# Patient Record
Sex: FEMALE | Race: WHITE
[De-identification: names, ages, dates, MRNs, and addresses within clinical notes are randomized per-mention and may not be internally consistent; named-entity substitution may affect disease eponyms.]

---

## 2020-08-13 LAB
ALBUMIN SERPL BCP-MCNC: 3.9 G/DL (ref 3.4–5)
ALBUMIN/GLOB SERPL: 1.1 {RATIO} (ref 1.1–1.5)
ALP SERPL-CCNC: 59 IU/L (ref 46–116)
ALT SERPL W P-5'-P-CCNC: 25 U/L (ref 30–65)
ANION GAP SERPL CALCULATED.3IONS-SCNC: 9 MMOL/L (ref 8–16)
APTT PPP: 25 SECONDS (ref 22–32)
AST SERPL W P-5'-P-CCNC: 24 U/L (ref 10–37)
BASOPHILS # BLD AUTO: 0 X10'3 (ref 0–0.2)
BASOPHILS NFR BLD AUTO: 0.3 % (ref 0–1)
BILIRUB SERPL-MCNC: 0.5 MG/DL (ref 0–1)
BUN SERPL-MCNC: 20 MG/DL (ref 7–18)
BUN/CREAT SERPL: 17.9 (ref 6.6–38)
CALCIUM SERPL-MCNC: 9.4 MG/DL (ref 8.5–10.1)
CHLORIDE SERPL-SCNC: 103 MMOL/L (ref 99–107)
CO2 SERPL-SCNC: 27.3 MMOL/L (ref 24–32)
CREAT SERPL-MCNC: 1.12 MG/DL (ref 0.4–0.9)
EOSINOPHIL # BLD AUTO: 0 X10'3 (ref 0–0.9)
EOSINOPHIL NFR BLD AUTO: 0.2 % (ref 0–6)
ERYTHROCYTE [DISTWIDTH] IN BLOOD BY AUTOMATED COUNT: 15 % (ref 11.5–14.5)
GFR SERPL CREATININE-BSD FRML MDRD: 47 ML/MIN
GLUCOSE SERPL-MCNC: 113 MG/DL (ref 70–104)
HCT VFR BLD AUTO: 34.8 % (ref 35–45)
HGB BLD-MCNC: 11.3 G/DL (ref 12–16)
INR PPP: 1 INR
LYMPHOCYTES # BLD AUTO: 1.7 X10'3 (ref 1.1–4.8)
LYMPHOCYTES NFR BLD AUTO: 14.6 % (ref 21–51)
MCH RBC QN AUTO: 30 PG (ref 27–31)
MCHC RBC AUTO-ENTMCNC: 32.4 G/DL (ref 33–36.5)
MCV RBC AUTO: 92.6 FL (ref 78–98)
MONOCYTES # BLD AUTO: 0.3 X10'3 (ref 0–0.9)
MONOCYTES NFR BLD AUTO: 2.9 % (ref 2–12)
NEUTROPHILS # BLD AUTO: 9.5 X10'3 (ref 1.8–7.7)
NEUTROPHILS NFR BLD AUTO: 82 % (ref 42–75)
PLATELET # BLD AUTO: 443 X10'3 (ref 140–440)
PLATELET FUNCTION (ADP): (no result) SECONDS (ref 63–104)
PMV BLD AUTO: 8.2 FL (ref 7.4–10.4)
POTASSIUM SERPL-SCNC: 4.2 MMOL/L (ref 3.4–5.1)
PROT SERPL-MCNC: 7.4 G/DL (ref 6.4–8.2)
PROTHROMBIN TIME: 10.3 SECONDS (ref 9–12)
RBC # BLD AUTO: 3.76 X10'6 (ref 4.2–5.6)
SODIUM SERPL-SCNC: 139 MMOL/L (ref 135–145)

## 2020-08-18 ENCOUNTER — HOSPITAL ENCOUNTER (OUTPATIENT)
Dept: HOSPITAL 94 - PAS | Age: 80
Discharge: HOME | End: 2020-08-18
Attending: OTOLARYNGOLOGY
Payer: MEDICARE

## 2020-08-18 VITALS — DIASTOLIC BLOOD PRESSURE: 66 MMHG | SYSTOLIC BLOOD PRESSURE: 153 MMHG

## 2020-08-18 VITALS — DIASTOLIC BLOOD PRESSURE: 73 MMHG | SYSTOLIC BLOOD PRESSURE: 148 MMHG

## 2020-08-18 VITALS — SYSTOLIC BLOOD PRESSURE: 141 MMHG | DIASTOLIC BLOOD PRESSURE: 78 MMHG

## 2020-08-18 VITALS — DIASTOLIC BLOOD PRESSURE: 76 MMHG | SYSTOLIC BLOOD PRESSURE: 144 MMHG

## 2020-08-18 VITALS — SYSTOLIC BLOOD PRESSURE: 158 MMHG | DIASTOLIC BLOOD PRESSURE: 84 MMHG

## 2020-08-18 VITALS — DIASTOLIC BLOOD PRESSURE: 75 MMHG | SYSTOLIC BLOOD PRESSURE: 146 MMHG

## 2020-08-18 VITALS — DIASTOLIC BLOOD PRESSURE: 91 MMHG | SYSTOLIC BLOOD PRESSURE: 155 MMHG

## 2020-08-18 VITALS — DIASTOLIC BLOOD PRESSURE: 78 MMHG | SYSTOLIC BLOOD PRESSURE: 135 MMHG

## 2020-08-18 VITALS — HEIGHT: 62 IN | BODY MASS INDEX: 21.16 KG/M2 | WEIGHT: 115 LBS

## 2020-08-18 DIAGNOSIS — Z88.1: ICD-10-CM

## 2020-08-18 DIAGNOSIS — Z72.89: ICD-10-CM

## 2020-08-18 DIAGNOSIS — Z79.01: ICD-10-CM

## 2020-08-18 DIAGNOSIS — Z11.59: ICD-10-CM

## 2020-08-18 DIAGNOSIS — F32.9: ICD-10-CM

## 2020-08-18 DIAGNOSIS — Z79.899: ICD-10-CM

## 2020-08-18 DIAGNOSIS — J32.8: Primary | ICD-10-CM

## 2020-08-18 DIAGNOSIS — Z88.2: ICD-10-CM

## 2020-08-18 DIAGNOSIS — J45.909: ICD-10-CM

## 2020-08-18 DIAGNOSIS — M19.90: ICD-10-CM

## 2020-08-18 DIAGNOSIS — Z88.8: ICD-10-CM

## 2020-08-18 DIAGNOSIS — N18.3: ICD-10-CM

## 2020-08-18 DIAGNOSIS — I12.9: ICD-10-CM

## 2020-08-18 PROCEDURE — 31253 NSL/SINS NDSC TOTAL: CPT

## 2020-08-18 PROCEDURE — 85730 THROMBOPLASTIN TIME PARTIAL: CPT

## 2020-08-18 PROCEDURE — 87075 CULTR BACTERIA EXCEPT BLOOD: CPT

## 2020-08-18 PROCEDURE — 61782 SCAN PROC CRANIAL EXTRA: CPT

## 2020-08-18 PROCEDURE — 87635 SARS-COV-2 COVID-19 AMP PRB: CPT

## 2020-08-18 PROCEDURE — 85025 COMPLETE CBC W/AUTO DIFF WBC: CPT

## 2020-08-18 PROCEDURE — 71046 X-RAY EXAM CHEST 2 VIEWS: CPT

## 2020-08-18 PROCEDURE — 36415 COLL VENOUS BLD VENIPUNCTURE: CPT

## 2020-08-18 PROCEDURE — 82948 REAGENT STRIP/BLOOD GLUCOSE: CPT

## 2020-08-18 PROCEDURE — 88304 TISSUE EXAM BY PATHOLOGIST: CPT

## 2020-08-18 PROCEDURE — 85610 PROTHROMBIN TIME: CPT

## 2020-08-18 PROCEDURE — 31259 NSL/SINS NDSC SPHN TISS RMVL: CPT

## 2020-08-18 PROCEDURE — 85576 BLOOD PLATELET AGGREGATION: CPT

## 2020-08-18 PROCEDURE — 94760 N-INVAS EAR/PLS OXIMETRY 1: CPT

## 2020-08-18 PROCEDURE — 87077 CULTURE AEROBIC IDENTIFY: CPT

## 2020-08-18 PROCEDURE — 87102 FUNGUS ISOLATION CULTURE: CPT

## 2020-08-18 PROCEDURE — 94640 AIRWAY INHALATION TREATMENT: CPT

## 2020-08-18 PROCEDURE — 93005 ELECTROCARDIOGRAM TRACING: CPT

## 2020-08-18 PROCEDURE — 80053 COMPREHEN METABOLIC PANEL: CPT

## 2020-08-18 PROCEDURE — 31267 ENDOSCOPY MAXILLARY SINUS: CPT

## 2020-08-18 PROCEDURE — 88311 DECALCIFY TISSUE: CPT

## 2020-08-18 PROCEDURE — 87186 SC STD MICRODIL/AGAR DIL: CPT

## 2020-08-18 PROCEDURE — 87070 CULTURE OTHR SPECIMN AEROBIC: CPT

## 2020-08-18 NOTE — NUR
Received from OR via , accompanied by Anesthesiologist DR HUERTA and report given by 
Anesthesiolgist. AWAKENS TO VOICE. VITALS STABLE. DRESSINGS DI. AB PAIN.

## 2020-08-18 NOTE — NUR
AWAKE AND ORIENTED. VITALS STABLE. NASAL DRESSING APPLIED FOR OOZING. AB PAIN. HOME WITH 
HER DAUGHTER AT THIS TIME.

## 2020-08-21 ENCOUNTER — HOSPITAL ENCOUNTER (EMERGENCY)
Dept: HOSPITAL 94 - ER | Age: 80
Discharge: HOME | End: 2020-08-21
Payer: MEDICARE

## 2020-08-21 VITALS — WEIGHT: 115.24 LBS | HEIGHT: 62 IN | BODY MASS INDEX: 21.21 KG/M2

## 2020-08-21 VITALS — SYSTOLIC BLOOD PRESSURE: 152 MMHG | DIASTOLIC BLOOD PRESSURE: 82 MMHG
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
## 2020-08-21 DIAGNOSIS — Z88.8: ICD-10-CM

## 2020-08-21 DIAGNOSIS — S09.90XA: Primary | ICD-10-CM

## 2020-08-21 DIAGNOSIS — M84.445A: ICD-10-CM

## 2020-08-21 DIAGNOSIS — Z86.711: ICD-10-CM
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
## 2020-08-21 DIAGNOSIS — S92.352A: ICD-10-CM

## 2020-08-21 DIAGNOSIS — Z88.2: ICD-10-CM

## 2020-08-21 DIAGNOSIS — Z79.899: ICD-10-CM

## 2020-08-21 DIAGNOSIS — Z79.01: ICD-10-CM

## 2020-08-21 PROCEDURE — 70450 CT HEAD/BRAIN W/O DYE: CPT

## 2020-08-21 PROCEDURE — 73630 X-RAY EXAM OF FOOT: CPT

## 2020-08-21 PROCEDURE — 99284 EMERGENCY DEPT VISIT MOD MDM: CPT

## 2020-08-21 PROCEDURE — 29515 APPLICATION SHORT LEG SPLINT: CPT
